# Patient Record
Sex: FEMALE | Race: WHITE
[De-identification: names, ages, dates, MRNs, and addresses within clinical notes are randomized per-mention and may not be internally consistent; named-entity substitution may affect disease eponyms.]

---

## 2018-03-21 ENCOUNTER — HOSPITAL ENCOUNTER (EMERGENCY)
Dept: HOSPITAL 80 - FED | Age: 18
Discharge: HOME | End: 2018-03-21
Payer: COMMERCIAL

## 2018-03-21 VITALS
SYSTOLIC BLOOD PRESSURE: 127 MMHG | TEMPERATURE: 98.4 F | HEART RATE: 87 BPM | RESPIRATION RATE: 18 BRPM | OXYGEN SATURATION: 97 % | DIASTOLIC BLOOD PRESSURE: 82 MMHG

## 2018-03-21 DIAGNOSIS — S46.911A: ICD-10-CM
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
## 2018-03-21 DIAGNOSIS — S43.101A: Primary | ICD-10-CM

## 2018-03-21 PROCEDURE — A4565 SLINGS: HCPCS

## 2018-03-21 NOTE — EDPHY
H & P


Stated Complaint: RIGHT SHOULDER PAIN AFTER A TACCKEL TO GROUND


Time Seen by Provider: 03/21/18 21:48


HPI/ROS: 





HPI





CHIEF COMPLAINT:  Right shoulder pain





HISTORY OF PRESENT ILLNESS:  Patient otherwise healthy 17-year-old female, 

denies having any significant medical history or surgical history she presents 

emergency room with right lateral shoulder pain.  She states she was playing 

rugby tonight and fell on her right shoulder.  She now has pain to the lateral 

aspect of the right shoulder.  She has full range of motion.  There is no 

obvious signs of swelling or signs of dislocation on exam.  Her axillary nerve 

is intact.  Distally she has good pulse.  Good cap refill.  Good  strength.

  With supination and pronation of the wrist she does feel some mild pain to 

the lateral aspect of the right shoulder.  Denies chest pain or shortness of 

breath.  Denies focal numbness or tingling.  Denies arm weakness.





Past Medical History:  No significant medical history





Past Surgical History:  No significant surgical history





Social History:  Denies daily use of drugs alcohol tobacco.  [employer] student.





Family History:  Noncontributory








ROS   


REVIEW OF SYSTEMS:


A comprehensive 10 point review of systems is otherwise negative aside from 

elements mentioned in the history of present illness.








Exam   


Constitutional  appears well nontoxic no acute distress, triage nursing summary 

reviewed, vital signs reviewed, awake/alert. 


Eyes   normal conjunctivae and sclera, EOMI, PERRLA. 


HENT   normal inspection, atraumatic, moist mucus membranes, no epistaxis, neck 

supple/ no meningismus, no raccoon eyes. 


Respiratory   clear to auscultation bilaterally, normal breath sounds, no 

respiratory distress, no wheezing. 


Cardiovascular   rate normal, regular rhythm, no murmur, no edema, distal 

pulses normal. 


Gastrointestinal   soft, non-tender, no rebound, no guarding, normal bowel 

sounds, no distension, no pulsatile mass. 


Genitourinary   no CVA tenderness. 


Musculoskeletal right upper extremity:  Good radial pulse.  Good cap refill, 

good  strength.  Tender palpation over the lateral right shoulder.  

Axillary nerve intact.  No obvious signs of swelling or trauma.  no midline 

vertebral tenderness, full range of motion, no calf swelling, no tenderness of 

extremities, no meningismus, good pulses, neurovascularly intact.


Skin   pink, warm, & dry, no rash, skin atraumatic. 


Neurologic   awake, alert and oriented x 3, AAOx3, moves all 4 extremities 

equally, motor intact, sensory intact, CN II-XII intact, normal cerebellar, 

normal vision, normal speech. 


Psychiatric   normal mood/affect. 


Heme/Lymph/Immune   no lymphadenopathy.





Differential Diagnosis:  Includes but is not limited to in a particular order 

shoulder strain, shoulder contusion, rotator cuff injury, shoulder sprain, 

fracture, dislocation relocation





Medical Decision Making:  Plan for this patient x-ray right shoulder, ibuprofen 

pain control, ice pack, sling.  If the x-ray is unremarkable will have her 

follow up with Orthopedics in 3-5 days if she continues to have pain.  I do 

recommend no rugby for 2 weeks.





Re-evaluation:








The right shoulder x-ray is being read by Radiology as a questionable AC joint 

separation however the patient on exam does not have any significant pain over 

the AC joint.  The pain is located right lateral shoulder.  X-rays otherwise 

unremarkable.





Patient has been placed in a sling.  Ice pack, anti-inflammatory pain medicine 

for pain control.  Recommend orthopedic follow-up.  She understands.  Patient 

updated.


Source: Patient





- Personal History


LMP (Females 10-55): 1-7 Days Ago


Current Tetanus/Diphtheria Vaccine: Yes


Current Tetanus Diphtheria and Acellular Pertussis (TDAP): Yes





- Medical/Surgical History


Hx Asthma: No


Hx Chronic Respiratory Disease: No


Hx Diabetes: No


Hx Cardiac Disease: No


Hx Renal Disease: No


Hx Cirrhosis: No


Hx Alcoholism: No


Hx HIV/AIDS: No


Hx Splenectomy or Spleen Trauma: No





- Social History


Smoking Status: Never smoked


Constitutional: 


 Initial Vital Signs











Temperature (C)  36.9 C   03/21/18 21:46


 


Heart Rate  87   03/21/18 21:46


 


Respiratory Rate  18   03/21/18 21:46


 


Blood Pressure  127/82 H  03/21/18 21:46


 


O2 Sat (%)  97   03/21/18 21:46








 











O2 Delivery Mode               Room Air














Allergies/Adverse Reactions: 


 





No Known Allergies Allergy (Unverified 03/21/18 21:47)


 








Home Medications: 














 Medication  Instructions  Recorded


 


Ibuprofen [Motrin (*)] 800 mg PO Q6-8PRN #10 tab 03/21/18














Medical Decision Making





- Diagnostics


Imaging Results: 


 Imaging Impressions





Shoulder X-Ray  03/21/18 21:49


Impression: Query type II AC separation. No fracture.














Departure





- Departure


Disposition: Home, Routine, Self-Care


Clinical Impression: 


Shoulder strain


Qualifiers:


 Encounter type: initial encounter Laterality: right Qualified Code(s): 

S46.911A - Strain of unspecified muscle, fascia and tendon at shoulder and 

upper arm level, right arm, initial encounter





Acromioclavicular joint separation, type 2


Qualifiers:


 Encounter type: initial encounter Laterality: right Qualified Code(s): 

S43.101A - Unspecified dislocation of right acromioclavicular joint, initial 

encounter





Condition: Good


Instructions:  Acromioclavicular Separation (ED), Arthralgia (ED), Shoulder 

Pain (ED)


Additional Instructions: 


1. Apply ice her shoulder over the next 48 hr.


2. Use her sling for comfort.


3. Take anti-inflammatory pain medicine for pain control this includes Tylenol 

Motrin.  May alternate these every 4-6 hours.


4. If you continue have pain 3-5 days please see Orthopedics.


Referrals: 


NONE *PRIMARY CARE P,. [Primary Care Provider] - As per Instructions


Norberto Fan MD [Medical Doctor] - As per Instructions


Prescriptions: 


Ibuprofen [Motrin (*)] 800 mg PO Q6-8PRN #10 tab